# Patient Record
Sex: MALE | Race: WHITE | Employment: OTHER | URBAN - METROPOLITAN AREA
[De-identification: names, ages, dates, MRNs, and addresses within clinical notes are randomized per-mention and may not be internally consistent; named-entity substitution may affect disease eponyms.]

---

## 2017-05-02 PROBLEM — S22.49XA RIBS, MULTIPLE FRACTURES: Status: ACTIVE | Noted: 2017-05-02

## 2017-09-03 PROBLEM — R06.09 EXERTIONAL DYSPNEA: Status: ACTIVE | Noted: 2017-09-03

## 2017-09-03 PROBLEM — J61 ASBESTOSIS (HCC): Status: ACTIVE | Noted: 2017-09-03

## 2017-09-03 PROBLEM — J18.9 PNEUMONIA: Status: ACTIVE | Noted: 2017-09-03

## 2017-09-17 PROBLEM — I63.9 CVA (CEREBRAL VASCULAR ACCIDENT) (HCC): Status: ACTIVE | Noted: 2017-09-17

## 2017-09-23 PROBLEM — Z79.4 TYPE 2 DIABETES MELLITUS WITH HYPOGLYCEMIA WITHOUT COMA, WITH LONG-TERM CURRENT USE OF INSULIN (HCC): Status: ACTIVE | Noted: 2017-09-23

## 2017-09-23 PROBLEM — E11.649 TYPE 2 DIABETES MELLITUS WITH HYPOGLYCEMIA WITHOUT COMA, WITH LONG-TERM CURRENT USE OF INSULIN (HCC): Status: ACTIVE | Noted: 2017-09-23

## 2018-03-22 PROBLEM — Z99.81 CHRONIC RESPIRATORY FAILURE WITH HYPOXIA, ON HOME O2 THERAPY (HCC): Chronic | Status: ACTIVE | Noted: 2018-03-22

## 2018-03-22 PROBLEM — M54.12 CERVICAL MYELOPATHY WITH CERVICAL RADICULOPATHY (HCC): Status: ACTIVE | Noted: 2018-03-22

## 2018-03-22 PROBLEM — J96.11 CHRONIC RESPIRATORY FAILURE WITH HYPOXIA, ON HOME O2 THERAPY (HCC): Chronic | Status: ACTIVE | Noted: 2018-03-22

## 2018-03-22 PROBLEM — G95.9 CERVICAL MYELOPATHY WITH CERVICAL RADICULOPATHY (HCC): Status: ACTIVE | Noted: 2018-03-22

## 2018-10-15 PROBLEM — R73.9 HYPERGLYCEMIA: Status: ACTIVE | Noted: 2018-10-15

## 2018-10-15 PROBLEM — K85.90 ACUTE PANCREATITIS: Status: ACTIVE | Noted: 2018-10-15

## 2018-10-15 PROBLEM — K85.90 PANCREATITIS: Status: ACTIVE | Noted: 2018-10-15

## 2019-06-03 PROBLEM — N17.9 AKI (ACUTE KIDNEY INJURY) (HCC): Status: ACTIVE | Noted: 2019-06-03

## 2019-11-24 PROBLEM — R07.9 CHEST PAIN: Status: ACTIVE | Noted: 2019-11-24

## 2019-11-24 PROBLEM — R06.00 DYSPNEA: Status: ACTIVE | Noted: 2019-11-24

## 2020-12-07 ENCOUNTER — DOCUMENTATION ONLY (OUTPATIENT)
Dept: NEUROLOGY | Age: 65
End: 2020-12-07

## 2020-12-07 NOTE — PROGRESS NOTES
Patient has canceled new patient appointment with neuropsychology 3 times due to transportation; one occurrence was same day cancellation. Per Dr. Jovana Leo we are unable to reschedule. Patient and referring provider aware.

## 2021-03-16 PROBLEM — E87.6 HYPOKALEMIA: Status: ACTIVE | Noted: 2021-03-16

## 2021-03-16 PROBLEM — R04.2 HEMOPTYSIS: Status: ACTIVE | Noted: 2021-03-16

## 2021-03-16 PROBLEM — D46.9 MYELODYSPLASTIC SYNDROME (HCC): Status: ACTIVE | Noted: 2021-03-16

## 2021-07-19 PROBLEM — Z96.41 INSULIN PUMP STATUS: Status: ACTIVE | Noted: 2017-08-13

## 2021-07-19 PROBLEM — C44.90 MALIGNANT NEOPLASM OF SKIN: Status: ACTIVE | Noted: 2021-01-01

## 2021-08-12 PROBLEM — J18.9 SEPSIS DUE TO PNEUMONIA (HCC): Status: ACTIVE | Noted: 2021-01-01

## 2021-08-12 PROBLEM — J44.1 COPD EXACERBATION (HCC): Status: ACTIVE | Noted: 2021-01-01

## 2021-08-12 PROBLEM — A41.9 SEPSIS DUE TO PNEUMONIA (HCC): Status: ACTIVE | Noted: 2021-01-01

## 2021-08-12 PROBLEM — E11.65 HYPERGLYCEMIA DUE TO DIABETES MELLITUS (HCC): Status: ACTIVE | Noted: 2021-01-01

## 2021-09-16 PROBLEM — R07.9 CHRONIC CHEST PAIN: Status: ACTIVE | Noted: 2021-01-01

## 2021-09-16 PROBLEM — R06.02 SHORTNESS OF BREATH: Status: ACTIVE | Noted: 2021-01-01

## 2021-09-16 PROBLEM — R60.9 PERIPHERAL EDEMA: Status: ACTIVE | Noted: 2021-01-01

## 2021-09-16 PROBLEM — G89.29 CHRONIC CHEST PAIN: Status: ACTIVE | Noted: 2021-01-01

## 2022-03-18 PROBLEM — N17.9 ACUTE KIDNEY INJURY (HCC): Status: ACTIVE | Noted: 2022-01-01

## 2022-05-10 PROBLEM — A41.9 SEPSIS (HCC): Status: ACTIVE | Noted: 2022-01-01

## 2022-05-10 PROBLEM — F03.90 DEMENTIA (HCC): Status: ACTIVE | Noted: 2022-01-01

## 2022-05-10 PROBLEM — E11.65 TYPE 2 DIABETES MELLITUS WITH HYPERGLYCEMIA (HCC): Status: ACTIVE | Noted: 2022-01-01

## 2022-05-21 PROBLEM — R41.82 ALTERED MENTAL STATUS: Status: ACTIVE | Noted: 2022-01-01

## 2022-06-18 PROBLEM — E16.2 HYPOGLYCEMIA: Status: ACTIVE | Noted: 2022-01-01

## 2022-06-18 PROBLEM — U07.1 COVID: Status: ACTIVE | Noted: 2022-01-01
